# Patient Record
Sex: FEMALE | Race: WHITE | NOT HISPANIC OR LATINO | URBAN - METROPOLITAN AREA
[De-identification: names, ages, dates, MRNs, and addresses within clinical notes are randomized per-mention and may not be internally consistent; named-entity substitution may affect disease eponyms.]

---

## 2017-09-28 ENCOUNTER — OFFICE (OUTPATIENT)
Dept: URBAN - METROPOLITAN AREA CLINIC 11 | Facility: CLINIC | Age: 67
End: 2017-09-28

## 2017-09-28 VITALS
WEIGHT: 178 LBS | DIASTOLIC BLOOD PRESSURE: 66 MMHG | SYSTOLIC BLOOD PRESSURE: 124 MMHG | HEART RATE: 75 BPM | HEIGHT: 64 IN

## 2017-09-28 DIAGNOSIS — Z79.01 LONG TERM (CURRENT) USE OF ANTICOAGULANTS: ICD-10-CM

## 2017-09-28 DIAGNOSIS — Z01.818 ENCOUNTER FOR OTHER PREPROCEDURAL EXAMINATION: ICD-10-CM

## 2017-09-28 DIAGNOSIS — Z86.010 PERSONAL HISTORY OF COLONIC POLYPS: ICD-10-CM

## 2017-09-28 RX ORDER — SODIUM PICOSULFATE, MAGNESIUM OXIDE, AND ANHYDROUS CITRIC ACID 10; 3.5; 12 MG/16.1G; G/16.1G; G/16.1G
POWDER, METERED ORAL
Qty: 1 | Refills: 0 | Status: ACTIVE
Start: 2017-09-28

## 2017-09-28 NOTE — SERVICENOTES
We discussed her history of colon polyps, the guidelines for f/u, her anticoagulation, and valve replacement.  We discussed the risks of the procedure inparticular the risks of clotting and vavlular infections.  She will need CV clearance and recs for her procedures.

## 2017-09-28 NOTE — SERVICEHPINOTES
She had a aortic valve replacement and aneurysm repaired July 2016.  She had afib post op which resolved. She is currently on Eliquis and ASA.  She has not had issues with chest pain or palpitations.  She has a history of colon polyps with a large adenoma in 2003.  She has since had a hyperplastic polyp.  She has not had bleeding or a change in her bowel pattern.  She has not had new GI issues.  She has been having normal stools and eating well.  No c/o upper Gi isssues.

## 2021-01-12 ENCOUNTER — OFFICE (OUTPATIENT)
Dept: URBAN - METROPOLITAN AREA PATHOLOGY 22 | Facility: PATHOLOGY | Age: 71
End: 2021-01-12
Payer: COMMERCIAL

## 2021-01-12 DIAGNOSIS — R94.5 ABNORMAL RESULTS OF LIVER FUNCTION STUDIES: ICD-10-CM

## 2021-01-12 PROCEDURE — 88325 CONSLTJ COMPRE RVW REC REPRT: CPT
